# Patient Record
(demographics unavailable — no encounter records)

---

## 2024-10-13 NOTE — REASON FOR VISIT
[Symptom and Test Evaluation] : symptom and test evaluation [FreeTextEntry1] : 62 year old M with DM, HLD, HTN who presents for f/u.   EKG 4/9/24 showed sinus rhythm with RBBB and LAFB Meds: losartan 50, atorva 10, metformin, glipzide, Jardiance, alogliptin

## 2024-10-13 NOTE — ASSESSMENT
[FreeTextEntry1] : 1) Abnormal EKG 2) DM, HTN, HLD - EKG 4/9/24 showed sinus rhythm with RBBB and LAFB - He has chronic venous stasis changes with trace LE edema. His lungs are clear otherwise. No CP or SOB. - Given his abnormal EKG, DM, and HTN, I advised pt to undergo TTE 4/16/24 which showed grossly hyperdynamic LV, asymmetric septal hypertrophy (~1.5 cm) with mildly increased LVOT gradient with Valsalva (21 mmHg), posterior mitral annular calcification, trace MR, trace TR, and no AI. - It is possible that his LV hypertrophy is related to long-standing HTN, DM, and weight. Continue good BP, HLD, DM control. I will consider cardiac MRI in the future to r/o HCM - Given he is asymptomatic without CP or SOB, will defer stress testing at this time  3) Follow-up